# Patient Record
Sex: FEMALE | Race: WHITE | NOT HISPANIC OR LATINO | Employment: UNEMPLOYED | URBAN - METROPOLITAN AREA
[De-identification: names, ages, dates, MRNs, and addresses within clinical notes are randomized per-mention and may not be internally consistent; named-entity substitution may affect disease eponyms.]

---

## 2020-12-10 ENCOUNTER — HOSPITAL ENCOUNTER (OUTPATIENT)
Facility: MEDICAL CENTER | Age: 25
End: 2020-12-10
Attending: NURSE PRACTITIONER
Payer: MEDICAID

## 2020-12-10 ENCOUNTER — GYNECOLOGY VISIT (OUTPATIENT)
Dept: OBGYN | Facility: CLINIC | Age: 25
End: 2020-12-10
Payer: MEDICAID

## 2020-12-10 VITALS
WEIGHT: 133 LBS | DIASTOLIC BLOOD PRESSURE: 80 MMHG | SYSTOLIC BLOOD PRESSURE: 122 MMHG | BODY MASS INDEX: 20.16 KG/M2 | HEIGHT: 68 IN

## 2020-12-10 DIAGNOSIS — Z01.419 WELL WOMAN EXAM: ICD-10-CM

## 2020-12-10 DIAGNOSIS — N94.6 PAINFUL MENSTRUAL PERIODS: ICD-10-CM

## 2020-12-10 PROBLEM — A60.00 HERPES GENITALIS: Status: ACTIVE | Noted: 2020-12-10

## 2020-12-10 PROCEDURE — 88175 CYTOPATH C/V AUTO FLUID REDO: CPT

## 2020-12-10 PROCEDURE — 87491 CHLMYD TRACH DNA AMP PROBE: CPT

## 2020-12-10 PROCEDURE — 99204 OFFICE O/P NEW MOD 45 MIN: CPT | Performed by: NURSE PRACTITIONER

## 2020-12-10 PROCEDURE — 87624 HPV HI-RISK TYP POOLED RSLT: CPT

## 2020-12-10 PROCEDURE — 87591 N.GONORRHOEAE DNA AMP PROB: CPT

## 2020-12-10 RX ORDER — VALACYCLOVIR HYDROCHLORIDE 500 MG/1
500 TABLET, FILM COATED ORAL DAILY
Qty: 40 TAB | Refills: 12 | Status: SHIPPED | OUTPATIENT
Start: 2020-12-10

## 2020-12-10 SDOH — HEALTH STABILITY: MENTAL HEALTH: HOW OFTEN DO YOU HAVE A DRINK CONTAINING ALCOHOL?: MONTHLY OR LESS

## 2020-12-10 NOTE — NON-PROVIDER
Patient here for annual exam. Wants to get tested for STDs  Last pap done/result: long time ago  LMP: 11/28/2020  BCM:none  Last mammogram, if applicable:n/a  Phone number:[phone]  Pharmacy verified
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
## 2020-12-11 DIAGNOSIS — Z01.419 WELL WOMAN EXAM: ICD-10-CM

## 2020-12-14 LAB
C TRACH DNA GENITAL QL NAA+PROBE: NEGATIVE
CYTOLOGY REG CYTOL: NORMAL
N GONORRHOEA DNA GENITAL QL NAA+PROBE: NEGATIVE
SPECIMEN SOURCE: NORMAL

## 2020-12-18 ENCOUNTER — TELEPHONE (OUTPATIENT)
Dept: OBGYN | Facility: CLINIC | Age: 25
End: 2020-12-18

## 2020-12-18 NOTE — TELEPHONE ENCOUNTER
----- Message from MIRTA Murray sent at 12/18/2020 11:17 AM PST -----  Pt messaged in my chart but can you call her and make sure she knows she needs another pap in one year due to ASCUS with HPV positive.      12/18/2020 1127 Left message for pt to call back regarding pap results.   1131 pt called back and notified as above. Pt asking if she should inform her partner or her past partners and what they need to do about it. Consulted with Tonia Jenkins CNM and advised pt should inform her current and past partners and they should communicated to their PCPs for f/u. Pt notified and verbalized understanding.

## 2020-12-19 LAB
HPV HR 12 DNA CVX QL NAA+PROBE: POSITIVE
HPV16 DNA SPEC QL NAA+PROBE: NEGATIVE
HPV18 DNA SPEC QL NAA+PROBE: NEGATIVE
SPECIMEN SOURCE: ABNORMAL

## 2021-01-05 ENCOUNTER — TELEPHONE (OUTPATIENT)
Dept: OBGYN | Facility: CLINIC | Age: 26
End: 2021-01-05

## 2021-01-05 ENCOUNTER — HOSPITAL ENCOUNTER (OUTPATIENT)
Dept: RADIOLOGY | Facility: MEDICAL CENTER | Age: 26
End: 2021-01-05
Attending: NURSE PRACTITIONER
Payer: MEDICAID

## 2021-01-05 DIAGNOSIS — Z01.419 WELL WOMAN EXAM: ICD-10-CM

## 2021-01-05 PROCEDURE — 76830 TRANSVAGINAL US NON-OB: CPT

## 2021-01-06 NOTE — TELEPHONE ENCOUNTER
----- Message from MIRTA Murray sent at 1/5/2021  2:48 PM PST -----  Call pt and let her know her US was normal. If she would like to discuss further options for her issues have her schedule with the GYN MD.    Pt called sating she was doing [employer] messaging with Bhavya PALOMINO JUVENCIO Jenkins MA and she was asking about her US results and on the message Bhavya stated pt was pregnant. Pt called concern because she is about 90% sure she is not pregnancy and not aware she is pregnant unless we show something different on a test. Reviewed Tonia Jenkins's CNM notes as above and US results. I apologized to pt and explained that most likely Bhavya mistyped but I do not see anywhere in her chart saying she is pregnant. I apologized again and informed her US is normal as mentioned above by Tonia Jenkins CNM. And Offered a f/u with MD to discuss further options. Pt stated that if her US is normal she is the only thing she cares about and did not want to schedule appt at this time.

## 2021-03-04 ENCOUNTER — PATIENT MESSAGE (OUTPATIENT)
Dept: OBGYN | Facility: CLINIC | Age: 26
End: 2021-03-04

## 2021-03-04 DIAGNOSIS — Z01.419 WELL WOMAN EXAM: ICD-10-CM

## 2021-03-04 NOTE — TELEPHONE ENCOUNTER
Called pt to notify her that I spoke to RJ at [facility]'s pharmacy and he stated that pt.'s insurance company only covers 30 tablets at once, and in regards to her blood work, she needs to go to a [facility] lab to get drawn, pt also wanted to know if her provider would be able to test her for herpes notified pt that she has already been diagnosed with herpes, pt still wants testing because she would like to know if it is in her blood or if she has antibodies, informed pt that per consult with Tonia APARICIO she will order it but she may get charged for it because medicaid may deny it, pt verbalized understanding.     Message from America Askew sent at 3/4/2021  1:18 PM PST -----  Regarding: Prescription Question  Contact: [phone]  Haider Randall, I just called [facility] pharmacy and it says that I only have 30 days for my prescription. Is there anyway for you to call me back at [phone] at your convenience. I also had a few questions about my blood test that you ordered for me. Thank you.

## 2021-11-14 ENCOUNTER — APPOINTMENT (OUTPATIENT)
Dept: URGENT CARE | Facility: CLINIC | Age: 26
End: 2021-11-14
Payer: MEDICAID